# Patient Record
Sex: FEMALE | Race: WHITE | NOT HISPANIC OR LATINO | URBAN - NONMETROPOLITAN AREA
[De-identification: names, ages, dates, MRNs, and addresses within clinical notes are randomized per-mention and may not be internally consistent; named-entity substitution may affect disease eponyms.]

---

## 2019-01-17 ENCOUNTER — IMPORTED ENCOUNTER (OUTPATIENT)
Dept: URBAN - NONMETROPOLITAN AREA CLINIC 1 | Facility: CLINIC | Age: 84
End: 2019-01-17

## 2019-01-17 PROBLEM — H16.223: Noted: 2019-01-17

## 2019-01-17 PROBLEM — H52.4: Noted: 2019-01-17

## 2019-01-17 PROBLEM — H35.3131: Noted: 2019-01-17

## 2019-01-17 PROBLEM — H26.492: Noted: 2019-01-17

## 2019-01-17 PROBLEM — H02.413: Noted: 2019-01-17

## 2019-01-17 PROBLEM — Z96.1: Noted: 2019-01-17

## 2019-01-17 PROCEDURE — 99213 OFFICE O/P EST LOW 20 MIN: CPT

## 2019-01-17 PROCEDURE — 92134 CPTRZ OPH DX IMG PST SGM RTA: CPT

## 2019-01-17 NOTE — PATIENT DISCUSSION
Mild ARMD OU-  Discussed diagnosis in detail with patient -  Mottling noted OU but stable from previous -  Continue eye vitamins/multivitamin daily such as Preservision-  Recommend continue following the [employer] [facility] any changes noted from today patient to call or come into the office ASAP  -  Continue to monitor -  RTC 6 months w/ OptosDES OU: -  Discussed diagnosis in detail with patient -  Signs/symptoms associated discussed w/ pt today as well including blurry vision. -  Continue Restasis OU BID-  Continue ATs PRN as well in between the Restasis. 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 -  Previously saw Dr. Idania Kemp for evaluation and possible treatment back in 2016 but pt deferred due to the cost as insurance would not cover. -  Patient is complaining again today about her superior vision. She feels when she lifts her eyelids upward she can see better. -  Will refer back to Dr. Idania Kemp when pt is ready. Hx of Headache/Temporal Pain - Discussed diagnosis in detail with patient- Patient has no headache complaint today at all resolved.  - Patient previously c/o pain on the right side of her head along with blurred vision.- She denied weight loss/pain while chewing.-  Previously did sed rate CRP which came back normal-  Continue to monitor Hyperopia/astig/presby-  Discussed refractive status in detail w/ pt -  Continue to monitor; 's Notes: MR  4/5/18 repeat by [email or web address]  1/17/19OCT mac 1/17/19Optos  7/12/18Tear Lab 11/2/15Refresh continues to useRestasis - restarted 9/28/17

## 2020-01-03 ENCOUNTER — IMPORTED ENCOUNTER (OUTPATIENT)
Dept: URBAN - NONMETROPOLITAN AREA CLINIC 1 | Facility: CLINIC | Age: 85
End: 2020-01-03

## 2020-01-03 PROCEDURE — 92250 FUNDUS PHOTOGRAPHY W/I&R: CPT

## 2020-01-03 PROCEDURE — 99213 OFFICE O/P EST LOW 20 MIN: CPT

## 2020-01-03 PROCEDURE — 92015 DETERMINE REFRACTIVE STATE: CPT

## 2020-01-03 NOTE — PATIENT DISCUSSION
Mild ARMD OU-  Discussed diagnosis in detail with patient -  Mottling noted OU but stable from previous -  Continue eye vitamins/multivitamin daily such as Preservision. Sample given 1/3/20-  Recommend continue following the [address] any changes noted from today patient to call or come into the office [facility]  -  Dwight Rosenberg done today stable OU-  Continue to monitor -  RTC 6 months w/ OCTDES OU: -  Discussed diagnosis in detail with patient -  Signs/symptoms associated discussed w/ pt today as well including blurry vision. -  Continue Restasis OU BID-  Continue ATs PRN as well in between the Restasis. 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 -  Previously saw Dr. Gato Jones for evaluation and possible treatment back in 2016 but pt deferred due to the cost as insurance would not cover. -  Patient is complaining again today about her superior vision. She feels when she lifts her eyelids upward she can see better. -  Will refer back to Dr. Gato Jones when pt is ready. Hx of Headache/Temporal Pain - Discussed diagnosis in detail with patient- Patient has no headache complaint today at all resolved.  - Patient previously c/o pain on the right side of her head along with blurred vision.- She denied weight loss/pain while chewing.-  Previously did sed rate CRP which came back normal-  Continue to monitor Hyperopia/astig/presby-  Discussed refractive status in detail w/ pt -  New glasses Rx given today-  Continue to monitor; 's Notes: MR  1/3/20DFE  1/3/20OCT mac 1/17/19Optos  1/3/20Tear Lab 11/2/15Refresh continues to useRestasis - restarted 9/28/17

## 2020-07-02 ENCOUNTER — IMPORTED ENCOUNTER (OUTPATIENT)
Dept: URBAN - NONMETROPOLITAN AREA CLINIC 1 | Facility: CLINIC | Age: 85
End: 2020-07-02

## 2020-07-02 PROCEDURE — 92134 CPTRZ OPH DX IMG PST SGM RTA: CPT

## 2020-07-02 PROCEDURE — 92014 COMPRE OPH EXAM EST PT 1/>: CPT

## 2020-07-02 NOTE — PATIENT DISCUSSION
Mild ARMD OU-  Discussed diagnosis in detail with patient -  Continue eye vitamins/multivitamin daily such as Preservision. Sample given 1/3/20-  Recommend continue following the [address] any changes noted from today patient to call or come into the office ASAP  -  Optos done previously stable OU-  OCT done today shows mottling OU but stablefrom previous -  Continue to monitor -  RTC 6 months w/ Optos and MR ANGÉLICA OU: -  Discussed diagnosis in detail with patient -  Signs/symptoms associated discussed w/ pt today as well including blurry vision. 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 -  Previously saw Dr. Benjie Homans for evaluation and possible treatment back in 2016 but pt deferred due to the cost as insurance would not cover. -  Patient is complaining again today about her superior vision. She feels when she lifts her eyelids upward she can see better. -  Will refer back to Dr. Benjie Homans when pt is ready. Hx of Headache/Temporal Pain - Discussed diagnosis in detail with patient- Patient has no headache complaint today at all resolved.  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

## 2021-01-07 ENCOUNTER — IMPORTED ENCOUNTER (OUTPATIENT)
Dept: URBAN - NONMETROPOLITAN AREA CLINIC 1 | Facility: CLINIC | Age: 86
End: 2021-01-07

## 2021-01-07 PROCEDURE — 92015 DETERMINE REFRACTIVE STATE: CPT

## 2021-01-07 PROCEDURE — 99213 OFFICE O/P EST LOW 20 MIN: CPT

## 2021-01-07 PROCEDURE — 92250 FUNDUS PHOTOGRAPHY W/I&R: CPT

## 2021-01-07 NOTE — PATIENT DISCUSSION
Mild ARMD OU-  Discussed diagnosis in detail with patient -  Continue eye vitamins/multivitamin daily such as Preservision. Sample given    1/3/20-  Recommend continue following the [address] any changes noted from today patient to call or come into the office ASAP  -  Optos done todays shows floaters stable from previous. -  OCT done previously shows mottling OU but stablefrom previous -  Continue to monitor -  RTC 6 months w/ OCT MAC ANGÉLICA OU: -  Discussed diagnosis in detail with patient -  Signs/symptoms associated discussed w/ pt today as well including blurry vision. 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 -  Previously saw Dr. Bianca Macdonald for evaluation and possible treatment back in 2016 but pt deferred due to the cost as insurance would not cover. -  Patient is complaining again today about her superior vision. She feels when she lifts her eyelids upward she can see better. -  Will refer back to Dr. Bianca Macdonald when pt is ready. Hx of Headache/Temporal Pain - Discussed diagnosis in detail with patient- Patient has no headache complaint today at all resolved.  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

## 2021-07-08 ENCOUNTER — IMPORTED ENCOUNTER (OUTPATIENT)
Dept: URBAN - NONMETROPOLITAN AREA CLINIC 1 | Facility: CLINIC | Age: 86
End: 2021-07-08

## 2021-07-08 PROCEDURE — 99214 OFFICE O/P EST MOD 30 MIN: CPT

## 2021-07-08 PROCEDURE — 92134 CPTRZ OPH DX IMG PST SGM RTA: CPT

## 2021-07-08 NOTE — PATIENT DISCUSSION
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 Sample given 1/3/20- Recommend continue following the [address] any changes noted from today patient to call or come into the office ASAP. Patient reports no changes in her grid 7/8/21- Optos done previously shows floaters stable from previous. - OCT done today shows mottling OU but stablefrom previous - Continue to monitor - RTC 1 year with OCT MAC ANGÉLICA OU: - Discussed diagnosis in detail with patient - Signs/symptoms associated discussed w/ pt today as well including blurry vision. 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 - Previously saw Dr. Kailey Unger for evaluation and possible treatment back in 2016 but pt deferred due to the cost as insurance would not cover. - Patient is complaining again today about her superior vision. She feels when she lifts her eyelids upward she can see better. - Will refer back to Dr. Kailey Unger when pt is ready. Hx of Headache/Temporal Pain - Discussed diagnosis in detail with patient- Patient has no headache complaint today at all resolved.  - Patient previously c/o pain on the right side of her head along with blurred vision.-She denied weight loss/pain while chewing.- Previously did sed rate CRP which came back normal- Continue to monitor Hyperopia/astig/presby- Discussed refractive status in detail w/ pt - Continue to monitor; 's Notes: MR  1/7/21DFE  1/3/20OCT mac 7/8/21Optos  1/7/21Tear Lab 11/2/15Refresh continues to useRestasis - restarted 9/28/17

## 2022-04-09 ASSESSMENT — VISUAL ACUITY
OU_SC: 20/25
OD_SC: 20/30
OD_SC: 20/30
OS_SC: 20/29
OD_PH: 20/30-
OD_SC: 20/40
OS_CC: J1
OS_SC: 20/30
OS_SC: 20/32-
OD_SC: 20/30-
OS_SC: 20/30+
OD_SC: 20/40+
OD_CC: J1
OS_SC: 20/30

## 2022-04-09 ASSESSMENT — TONOMETRY
OD_IOP_MMHG: 15
OD_IOP_MMHG: 16
OS_IOP_MMHG: 16
OS_IOP_MMHG: 15
OD_IOP_MMHG: 16
OS_IOP_MMHG: 16
OS_IOP_MMHG: 16
OD_IOP_MMHG: 16
OS_IOP_MMHG: 16
OD_IOP_MMHG: 15

## 2022-07-11 ENCOUNTER — COMPREHENSIVE EXAM (OUTPATIENT)
Dept: URBAN - NONMETROPOLITAN AREA CLINIC 1 | Facility: CLINIC | Age: 87
End: 2022-07-11

## 2022-07-11 DIAGNOSIS — H52.4: ICD-10-CM

## 2022-07-11 PROCEDURE — 92014 COMPRE OPH EXAM EST PT 1/>: CPT

## 2022-07-11 PROCEDURE — 92015 DETERMINE REFRACTIVE STATE: CPT

## 2022-07-11 ASSESSMENT — VISUAL ACUITY
OD_CC: 20/32-
OS_CC: 20/29-

## 2022-07-11 ASSESSMENT — TONOMETRY
OS_IOP_MMHG: 14
OD_IOP_MMHG: 15

## 2022-07-11 NOTE — PATIENT DISCUSSION
Continue eye vitamins/multivitamin daily such as Preservision. Sample given 1/3/20. Recommend continue following the [address] any changes noted from today patient to call or come into the office ASAP. Patient reports no changes in her grid 7/11/22.